# Patient Record
Sex: MALE | Race: WHITE | NOT HISPANIC OR LATINO | Employment: FULL TIME | URBAN - METROPOLITAN AREA
[De-identification: names, ages, dates, MRNs, and addresses within clinical notes are randomized per-mention and may not be internally consistent; named-entity substitution may affect disease eponyms.]

---

## 2022-12-09 ENCOUNTER — HOSPITAL ENCOUNTER (EMERGENCY)
Facility: HOSPITAL | Age: 22
Discharge: HOME | End: 2022-12-09
Attending: EMERGENCY MEDICINE
Payer: OTHER GOVERNMENT

## 2022-12-09 VITALS
HEART RATE: 87 BPM | WEIGHT: 250 LBS | OXYGEN SATURATION: 97 % | HEIGHT: 74 IN | SYSTOLIC BLOOD PRESSURE: 137 MMHG | DIASTOLIC BLOOD PRESSURE: 76 MMHG | BODY MASS INDEX: 32.08 KG/M2 | TEMPERATURE: 99 F | RESPIRATION RATE: 16 BRPM

## 2022-12-09 DIAGNOSIS — N50.811 PAIN IN RIGHT TESTICLE: Primary | ICD-10-CM

## 2022-12-09 DIAGNOSIS — R52 PAIN: ICD-10-CM

## 2022-12-09 LAB
BILIRUB UR QL STRIP: NEGATIVE
CLARITY UR: CLEAR
COLOR UR: YELLOW
GLUCOSE UR QL STRIP: NEGATIVE
HGB UR QL STRIP: NEGATIVE
KETONES UR QL STRIP: NEGATIVE
LEUKOCYTE ESTERASE UR QL STRIP: NEGATIVE
NITRITE UR QL STRIP: NEGATIVE
PH UR STRIP: 8 [PH] (ref 5–8)
PROT UR QL STRIP: NEGATIVE
SP GR UR STRIP: 1.02 (ref 1–1.03)
URN SPEC COLLECT METH UR: NORMAL
UROBILINOGEN UR STRIP-ACNC: NEGATIVE EU/DL

## 2022-12-09 PROCEDURE — 63600175 PHARM REV CODE 636 W HCPCS: Performed by: NURSE PRACTITIONER

## 2022-12-09 PROCEDURE — 99285 EMERGENCY DEPT VISIT HI MDM: CPT

## 2022-12-09 PROCEDURE — 81003 URINALYSIS AUTO W/O SCOPE: CPT | Performed by: NURSE PRACTITIONER

## 2022-12-09 PROCEDURE — 96372 THER/PROPH/DIAG INJ SC/IM: CPT | Performed by: NURSE PRACTITIONER

## 2022-12-09 PROCEDURE — 87491 CHLMYD TRACH DNA AMP PROBE: CPT | Performed by: NURSE PRACTITIONER

## 2022-12-09 PROCEDURE — 87591 N.GONORRHOEAE DNA AMP PROB: CPT | Performed by: NURSE PRACTITIONER

## 2022-12-09 RX ORDER — ACETAMINOPHEN 500 MG
1000 TABLET ORAL EVERY 6 HOURS PRN
Qty: 100 TABLET | Refills: 0 | Status: ON HOLD | OUTPATIENT
Start: 2022-12-09 | End: 2023-10-18 | Stop reason: HOSPADM

## 2022-12-09 RX ORDER — MELOXICAM 15 MG/1
15 TABLET ORAL DAILY
Qty: 30 TABLET | Refills: 0 | Status: ON HOLD | OUTPATIENT
Start: 2022-12-09 | End: 2023-10-18 | Stop reason: HOSPADM

## 2022-12-09 RX ORDER — KETOROLAC TROMETHAMINE 30 MG/ML
30 INJECTION, SOLUTION INTRAMUSCULAR; INTRAVENOUS
Status: COMPLETED | OUTPATIENT
Start: 2022-12-09 | End: 2022-12-09

## 2022-12-09 RX ADMIN — KETOROLAC TROMETHAMINE 30 MG: 30 INJECTION, SOLUTION INTRAMUSCULAR; INTRAVENOUS at 04:12

## 2022-12-09 NOTE — Clinical Note
"Surya Renee (William) was seen and treated in our emergency department on 12/9/2022.  He may return to work on 12/12/2022.       If you have any questions or concerns, please don't hesitate to call.      Dianelys Moya NP"

## 2022-12-09 NOTE — ED PROVIDER NOTES
"Encounter Date: 12/9/2022       History     Chief Complaint   Patient presents with    Testicle Pain     The patient reports non-traumatic right testicular pain x 3 days. Denies swelling, penile discharge, hematuria, dysuria, abdominal pain.      HPI  Review of patient's allergies indicates:  No Known Allergies  History reviewed. No pertinent past medical history.  History reviewed. No pertinent surgical history.  History reviewed. No pertinent family history.  Social History     Tobacco Use    Smoking status: Never     Passive exposure: Never    Smokeless tobacco: Never   Substance Use Topics    Alcohol use: Yes    Drug use: Never     Review of Systems    Physical Exam     Initial Vitals [12/09/22 1506]   BP Pulse Resp Temp SpO2   137/76 87 16 99.2 °F (37.3 °C) 97 %      MAP       --         Physical Exam    ED Course   Procedures  Labs Reviewed - No data to display       Imaging Results    None          Medications - No data to display                           Clinical Impression:    ***Please document a Clinical Impression and click the "Refresh" button to refresh your note and automatically pull in before signing.***         "

## 2022-12-09 NOTE — ED TRIAGE NOTES
Pt presents to the ED with  non-traumatic right testicular pain x 3 days.   Pt denies swelling, penile discharge, hematuria, dysuria, abdominal pain.   Pt AAOX4

## 2022-12-09 NOTE — ED PROVIDER NOTES
Encounter Date: 12/9/2022    SCRIBE #1 NOTE: I, Darshan Escobedoold, am scribing for, and in the presence of,  Dianelys Moya NP.     History     Chief Complaint   Patient presents with    Testicle Pain     The patient reports non-traumatic right testicular pain x 3 days. Denies swelling, penile discharge, hematuria, dysuria, abdominal pain.      Surya Renee is a 22 y.o. male with no pertinent past medical history who presents to the Emergency Department for evaluation of acute, constant, right testicular pain persisting for 4 days. Patient expresses concerns the pain may be related to catching a box that accidentally struck his generalized groin area. He cannot recall any other possible inciting events. Patient denies any penile discharge, dysuria, scrotal swelling, fevers, abdominal pain, vomiting, new sexual partners, or other associated symptoms. He states he has never experienced pain like this in the past. He reports that he has no known drug allergies.      The history is provided by the patient.   Review of patient's allergies indicates:  No Known Allergies  History reviewed. No pertinent past medical history.  History reviewed. No pertinent surgical history.  History reviewed. No pertinent family history.  Social History     Tobacco Use    Smoking status: Never     Passive exposure: Never    Smokeless tobacco: Never   Substance Use Topics    Alcohol use: Yes    Drug use: Never     Review of Systems   Constitutional:  Negative for fever.   HENT:  Negative for sore throat.    Eyes:  Negative for pain.   Respiratory:  Negative for cough.    Cardiovascular:  Negative for chest pain.   Gastrointestinal:  Negative for abdominal pain, nausea and vomiting.   Genitourinary:  Positive for testicular pain. Negative for dysuria, frequency, hematuria, penile discharge and scrotal swelling.   Musculoskeletal:  Negative for back pain.   Skin:  Negative for rash.   Neurological:  Negative for headaches.     Physical  Exam     Initial Vitals [12/09/22 1506]   BP Pulse Resp Temp SpO2   137/76 87 16 99.2 °F (37.3 °C) 97 %      MAP       --         Physical Exam    Nursing note and vitals reviewed.  Constitutional: He appears well-developed and well-nourished.   HENT:   Head: Normocephalic.   Eyes: Conjunctivae are normal.   Neck: Neck supple.   Normal range of motion.  Abdominal: Abdomen is soft. He exhibits no distension. There is no abdominal tenderness. There is no rebound and no guarding.   Genitourinary:    Penis normal.   No discharge found.    Genitourinary Comments: No swelling, tenderness or erythema.  No hernia present.  No lesions     Musculoskeletal:         General: Normal range of motion.      Cervical back: Normal range of motion and neck supple.     Neurological: He is alert and oriented to person, place, and time. GCS score is 15. GCS eye subscore is 4. GCS verbal subscore is 5. GCS motor subscore is 6.   Skin: Skin is warm and dry. Capillary refill takes less than 2 seconds.   Psychiatric: He has a normal mood and affect.       ED Course   Procedures  Labs Reviewed   C. TRACHOMATIS/N. GONORRHOEAE BY AMP DNA   URINALYSIS, REFLEX TO URINE CULTURE    Narrative:     Specimen Source->Urine          Imaging Results              US Scrotum And Testicles (Final result)  Result time 12/09/22 16:20:27      Final result by Govind Cervantes Jr., MD (12/09/22 16:20:27)                   Impression:      No significant abnormality.      Electronically signed by: Govind Bland Jr  Date:    12/09/2022  Time:    16:20               Narrative:    EXAMINATION:  US SCROTUM AND TESTICLES    CLINICAL HISTORY:  Pain, unspecified    TECHNIQUE:  Sonography of the scrotum and testes.    COMPARISON:  None.    FINDINGS:  Right Testicle:    *Size: 6.2 x 2.5 x 2.8 cm  *Appearance: Normal.  *Flow: Normal arterial and venous flow  *Epididymis: Normal.  *Hydrocele: None.  *Varicocele: None.  .    Left Testicle:    *Size: 5.7 x 2.5 x 3.2  cm  *Appearance: Normal.  *Flow: Normal arterial and venous flow  *Epididymis: Normal.  *Hydrocele: None.  *Varicocele: None.  .    Other findings: None.                                       Medications   ketorolac injection 30 mg (30 mg Intramuscular Given 12/9/22 1612)     Medical Decision Making:   History:   Old Medical Records: I decided to obtain old medical records.  Differential Diagnosis:   Hernia, orchitis, epididymitis  Clinical Tests:   Lab Tests: Ordered and Reviewed  Radiological Study: Reviewed and Ordered  ED Management:  Diagnosis management comments: This is an urgent evaluation of a 22-year-old male that presented to the ER with c/o right-sided testicular pain x4 days. Pts exam was as above.     Labs and u/s were reviewed and discussed with pt.  I will discharge patient with Mobic and Tylenol for symptomatic treatment of his discomfort.  GC chlamydia are pending although patient states that that is not likely.  Patient has been instructed to use scrotal support and cool compresses to area.  He has been given follow-up instructions with urology if there is no improvement in his condition.    Based on exam today - I have low suspicion for medical, surgical or other life threatening condition and I believe pt is safe for discharge and outpatient f/u.    Pt verbalizes understanding of d/c instructions and will return for worsening condition.              Scribe Attestation:   Scribe #1: I performed the above scribed service and the documentation accurately describes the services I performed. I attest to the accuracy of the note.                   Clinical Impression:   Final diagnoses:  [R52] Pain  [N50.811] Pain in right testicle (Primary)        ED Disposition Condition    Discharge Stable          ED Prescriptions       Medication Sig Dispense Start Date End Date Auth. Provider    meloxicam (MOBIC) 15 MG tablet Take 1 tablet (15 mg total) by mouth once daily. 30 tablet 12/9/2022 -- Dianelys OSEGUERA  DERIAN Moya    acetaminophen (TYLENOL) 500 MG tablet Take 2 tablets (1,000 mg total) by mouth every 6 (six) hours as needed for Pain (or fever). 100 tablet 12/9/2022 -- Dianelys Moya NP          Follow-up Information       Follow up With Specialties Details Why Contact Info    Emma Woo NP Family Medicine   [address] [facility] [address]  [phone]      [facility] - Emergency Dept Emergency Medicine  If symptoms worsen or any other concerns [address] [place], [place] [address] [id number] [phone]    Marcio Rodrigues MD Urology   [address] [id number] [phone]            I, Dianelys Moya NP-C  , personally performed the services described in this documentation. All medical record entries made by the scribe were at my direction and in my presence. I have reviewed the chart and agree that the record reflects my personal performance and is accurate and complete.       Dianelys Moya NP  12/09/22 [id number]

## 2022-12-11 LAB
C TRACH DNA SPEC QL NAA+PROBE: NOT DETECTED
N GONORRHOEA DNA SPEC QL NAA+PROBE: NOT DETECTED

## 2023-10-12 ENCOUNTER — HOSPITAL ENCOUNTER (EMERGENCY)
Facility: OTHER | Age: 23
Discharge: PSYCHIATRIC OR SUBSTANCE USE FACILITY | End: 2023-10-12
Attending: EMERGENCY MEDICINE
Payer: OTHER GOVERNMENT

## 2023-10-12 VITALS
HEIGHT: 74 IN | WEIGHT: 250 LBS | OXYGEN SATURATION: 97 % | TEMPERATURE: 98 F | DIASTOLIC BLOOD PRESSURE: 79 MMHG | RESPIRATION RATE: 16 BRPM | BODY MASS INDEX: 32.08 KG/M2 | SYSTOLIC BLOOD PRESSURE: 137 MMHG | HEART RATE: 81 BPM

## 2023-10-12 DIAGNOSIS — F32.A DEPRESSION WITH SUICIDAL IDEATION: Primary | ICD-10-CM

## 2023-10-12 DIAGNOSIS — R45.851 DEPRESSION WITH SUICIDAL IDEATION: Primary | ICD-10-CM

## 2023-10-12 LAB
ALBUMIN SERPL BCP-MCNC: 4.3 G/DL (ref 3.5–5.2)
ALP SERPL-CCNC: 103 U/L (ref 55–135)
ALT SERPL W/O P-5'-P-CCNC: 44 U/L (ref 10–44)
AMPHET+METHAMPHET UR QL: NEGATIVE
ANION GAP SERPL CALC-SCNC: 14 MMOL/L (ref 8–16)
APAP SERPL-MCNC: <3 UG/ML (ref 10–20)
AST SERPL-CCNC: 25 U/L (ref 10–40)
BARBITURATES UR QL SCN>200 NG/ML: NEGATIVE
BASOPHILS # BLD AUTO: 0.04 K/UL (ref 0–0.2)
BASOPHILS NFR BLD: 0.5 % (ref 0–1.9)
BENZODIAZ UR QL SCN>200 NG/ML: NEGATIVE
BILIRUB SERPL-MCNC: 0.2 MG/DL (ref 0.1–1)
BILIRUB UR QL STRIP: NEGATIVE
BUN SERPL-MCNC: 14 MG/DL (ref 6–20)
BZE UR QL SCN: NEGATIVE
CALCIUM SERPL-MCNC: 9.9 MG/DL (ref 8.7–10.5)
CANNABINOIDS UR QL SCN: NEGATIVE
CHLORIDE SERPL-SCNC: 106 MMOL/L (ref 95–110)
CLARITY UR: CLEAR
CO2 SERPL-SCNC: 23 MMOL/L (ref 23–29)
COLOR UR: YELLOW
CREAT SERPL-MCNC: 0.9 MG/DL (ref 0.5–1.4)
CREAT UR-MCNC: 231.6 MG/DL (ref 23–375)
DIFFERENTIAL METHOD: NORMAL
EOSINOPHIL # BLD AUTO: 0 K/UL (ref 0–0.5)
EOSINOPHIL NFR BLD: 0.2 % (ref 0–8)
ERYTHROCYTE [DISTWIDTH] IN BLOOD BY AUTOMATED COUNT: 12.6 % (ref 11.5–14.5)
EST. GFR  (NO RACE VARIABLE): >60 ML/MIN/1.73 M^2
ETHANOL SERPL-MCNC: <10 MG/DL
GLUCOSE SERPL-MCNC: 95 MG/DL (ref 70–110)
GLUCOSE UR QL STRIP: NEGATIVE
HCT VFR BLD AUTO: 45 % (ref 40–54)
HGB BLD-MCNC: 14.7 G/DL (ref 14–18)
HGB UR QL STRIP: NEGATIVE
IMM GRANULOCYTES # BLD AUTO: 0.02 K/UL (ref 0–0.04)
IMM GRANULOCYTES NFR BLD AUTO: 0.2 % (ref 0–0.5)
KETONES UR QL STRIP: NEGATIVE
LEUKOCYTE ESTERASE UR QL STRIP: NEGATIVE
LYMPHOCYTES # BLD AUTO: 2 K/UL (ref 1–4.8)
LYMPHOCYTES NFR BLD: 23.3 % (ref 18–48)
MCH RBC QN AUTO: 28.7 PG (ref 27–31)
MCHC RBC AUTO-ENTMCNC: 32.7 G/DL (ref 32–36)
MCV RBC AUTO: 88 FL (ref 82–98)
METHADONE UR QL SCN>300 NG/ML: NEGATIVE
MONOCYTES # BLD AUTO: 0.6 K/UL (ref 0.3–1)
MONOCYTES NFR BLD: 7.5 % (ref 4–15)
NEUTROPHILS # BLD AUTO: 5.9 K/UL (ref 1.8–7.7)
NEUTROPHILS NFR BLD: 68.3 % (ref 38–73)
NITRITE UR QL STRIP: NEGATIVE
NRBC BLD-RTO: 0 /100 WBC
OPIATES UR QL SCN: NEGATIVE
PCP UR QL SCN>25 NG/ML: NEGATIVE
PH UR STRIP: 6 [PH] (ref 5–8)
PLATELET # BLD AUTO: 345 K/UL (ref 150–450)
PMV BLD AUTO: 9.7 FL (ref 9.2–12.9)
POTASSIUM SERPL-SCNC: 3.7 MMOL/L (ref 3.5–5.1)
PROT SERPL-MCNC: 8.3 G/DL (ref 6–8.4)
PROT UR QL STRIP: NEGATIVE
RBC # BLD AUTO: 5.13 M/UL (ref 4.6–6.2)
SODIUM SERPL-SCNC: 143 MMOL/L (ref 136–145)
SP GR UR STRIP: 1.02 (ref 1–1.03)
TOXICOLOGY INFORMATION: NORMAL
TSH SERPL DL<=0.005 MIU/L-ACNC: 1.86 UIU/ML (ref 0.4–4)
URN SPEC COLLECT METH UR: NORMAL
UROBILINOGEN UR STRIP-ACNC: NEGATIVE EU/DL
WBC # BLD AUTO: 8.57 K/UL (ref 3.9–12.7)

## 2023-10-12 PROCEDURE — 80143 DRUG ASSAY ACETAMINOPHEN: CPT | Performed by: EMERGENCY MEDICINE

## 2023-10-12 PROCEDURE — 85025 COMPLETE CBC W/AUTO DIFF WBC: CPT | Performed by: EMERGENCY MEDICINE

## 2023-10-12 PROCEDURE — 84443 ASSAY THYROID STIM HORMONE: CPT | Performed by: EMERGENCY MEDICINE

## 2023-10-12 PROCEDURE — 80307 DRUG TEST PRSMV CHEM ANLYZR: CPT | Performed by: EMERGENCY MEDICINE

## 2023-10-12 PROCEDURE — 99205 PR OFFICE/OUTPT VISIT, NEW, LEVL V, 60-74 MIN: ICD-10-PCS | Mod: 95,,, | Performed by: PSYCHIATRY & NEUROLOGY

## 2023-10-12 PROCEDURE — 81003 URINALYSIS AUTO W/O SCOPE: CPT | Mod: 59 | Performed by: EMERGENCY MEDICINE

## 2023-10-12 PROCEDURE — 80053 COMPREHEN METABOLIC PANEL: CPT | Performed by: EMERGENCY MEDICINE

## 2023-10-12 PROCEDURE — 99285 EMERGENCY DEPT VISIT HI MDM: CPT

## 2023-10-12 PROCEDURE — 82077 ASSAY SPEC XCP UR&BREATH IA: CPT | Performed by: EMERGENCY MEDICINE

## 2023-10-12 PROCEDURE — 99205 OFFICE O/P NEW HI 60 MIN: CPT | Mod: 95,,, | Performed by: PSYCHIATRY & NEUROLOGY

## 2023-10-12 NOTE — ED PROVIDER NOTES
"Encounter Date: 10/12/2023    SCRIBE #1 NOTE: I, Manny Chase, am scribing for, and in the presence of,  Elizabeth Duvall MD. I have scribed the following portions of the note - Other sections scribed: HPI, ROS, PE.       History     Chief Complaint   Patient presents with    Suicidal     Pt c/o suicidal ideation and bingeing and purging behavior in the last few days.      23 year-old male who presents with complaint of suicidal ideation for the past week. He states that he has been struggling with depression for the last few months and was prescribed Zoloft 50 mg by his psychiatrist one month ago.  Since starting medication, he noticed improvement in his depression symptoms until last week, at which time "everything bubbled back over."  While he does not report any new significant life stressors, he notes that his current workload in the  has him under increased stress.  He reports frequent suicidal thoughts include wanting to cut himself with a knife, urges to take a large amount of medication in an attempt "to go to sleep and never wake up", and thoughts of driving off a bridge. He also reports difficulty sleeping at night but taking naps during the day, and he goes through periods of switching between not eating, binge eating, and then taking laxatives to purge after eating. He denies any recent illness or any fever, cough, nausea, or vomiting. His PMHx does include asthma. However, he denies any alcohol, tobacco, or illicit drug use. NKDA. This is the extent of the patient's complaints at this time.    The history is provided by the patient.     Review of patient's allergies indicates:  No Known Allergies  Past Medical History:   Diagnosis Date    Asthma     Depression      History reviewed. No pertinent surgical history.  History reviewed. No pertinent family history.  Social History     Tobacco Use    Smoking status: Never     Passive exposure: Never    Smokeless tobacco: Never   Substance Use " Topics    Alcohol use: Yes    Drug use: Never     Review of Systems   Constitutional:  Negative for chills and fever.   HENT:  Negative for congestion and sore throat.    Eyes:  Negative for visual disturbance.   Respiratory:  Negative for cough and shortness of breath.    Cardiovascular:  Negative for chest pain and palpitations.   Gastrointestinal:  Negative for abdominal pain, diarrhea and vomiting.   Genitourinary:  Negative for decreased urine volume, dysuria and frequency.   Musculoskeletal:  Negative for joint swelling, neck pain and neck stiffness.   Skin:  Negative for rash and wound.   Neurological:  Negative for weakness, numbness and headaches.   Psychiatric/Behavioral:  Positive for dysphoric mood and suicidal ideas. Negative for behavioral problems, confusion and hallucinations.        Physical Exam     Initial Vitals [10/12/23 1446]   BP Pulse Resp Temp SpO2   (!) 142/85 88 19 98.1 °F (36.7 °C) 96 %      MAP       --         Physical Exam    Constitutional: He appears well-developed and well-nourished.   HENT:   Head: Normocephalic and atraumatic.   Nose: Nose normal.   Mouth/Throat: Oropharynx is clear and moist.   Eyes: Conjunctivae and EOM are normal. Pupils are equal, round, and reactive to light.   Neck: Neck supple.   Normal range of motion.  Cardiovascular:  Normal rate and regular rhythm.     Exam reveals no gallop and no friction rub.       No murmur heard.  Pulmonary/Chest: Breath sounds normal. No respiratory distress. He has no wheezes. He has no rales.   Abdominal: Abdomen is soft. There is no abdominal tenderness. There is no rebound and no guarding.   Musculoskeletal:         General: No tenderness or edema.      Cervical back: Normal range of motion and neck supple.     Neurological: He is alert and oriented to person, place, and time. He has normal strength. No cranial nerve deficit or sensory deficit. Gait normal. GCS score is 15. GCS eye subscore is 4. GCS verbal subscore is 5. GCS  motor subscore is 6.   Skin: Skin is warm and dry. No rash noted.   Psychiatric: His speech is normal. He is not actively hallucinating. He expresses suicidal ideation. He expresses no homicidal ideation.   Depressed mood. Tearful affect.          ED Course   Procedures  Labs Reviewed   ACETAMINOPHEN LEVEL - Abnormal; Notable for the following components:       Result Value    Acetaminophen (Tylenol), Serum <3.0 (*)     All other components within normal limits   CBC W/ AUTO DIFFERENTIAL   COMPREHENSIVE METABOLIC PANEL   TSH   URINALYSIS, REFLEX TO URINE CULTURE    Narrative:     Specimen Source->Urine   DRUG SCREEN PANEL, URINE EMERGENCY    Narrative:     Specimen Source->Urine   ALCOHOL,MEDICAL (ETHANOL)          Imaging Results    None          Medications - No data to display  Medical Decision Making  Emergent evaluation a 23-year-old male who presents with complaint of suicidal ideations, recent history of depression on Zoloft.  Vital signs are benign, afebrile.  Physical exam is benign other than dysphoric mood and suicidal ideations.  Patient had telemedicine psychiatry consultation, recommended psychiatric placement.  Pec was placed as I do feel he is potentially a danger to himself.  Patient is medically cleared and transferred to an appropriate psychiatric facility once accepted.    Amount and/or Complexity of Data Reviewed  Labs: ordered. Decision-making details documented in ED Course.            Scribe Attestation:   Scribe #1: I performed the above scribed service and the documentation accurately describes the services I performed. I attest to the accuracy of the note.    Physician Attestation for Scribe: I, Elizabeth Duvall, reviewed documentation as scribed in my presence, which is both accurate and complete.        ED Course as of 10/12/23 1801   Thu Oct 12, 2023   1638 TSH  Reviewed and within normal limits. [AK]   1638 CBC auto differential  Reviewed and within normal limits, no leukocytosis or  profound anemia. [AK]   1638 Comprehensive metabolic panel  Reviewed and within normal limits.  No evidence of hepatitis, azotemia, or major electrolyte derangement. [AK]   1713 Acetaminophen level(!)  Reviewed and negative. [AK]   1713 Ethanol  Reviewed and negative. [AK]   1713 Urinalysis, Reflex to Urine Culture Urine, Clean Catch  Reviewed and negative. [AK]   1758 Drug screen panel, emergency  Reviewed and negative. [AK]      ED Course User Index  [AK] Elizabeth Duvall MD       Medically cleared for psychiatry placement: 10/12/2023  5:14 PM            Clinical Impression:   Final diagnoses:  [F32.A, R45.851] Depression with suicidal ideation (Primary)        ED Disposition Condition    Transfer to Psych Facility Stable                 Elizabeth Duvall MD  10/12/23 1813
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
## 2023-10-12 NOTE — ED NOTES
Pt's belongings and valuables collected and given to security.    Belongings:  Pair of shoes  Pair of pants  Pair of socks  1 shirt  1 sweatshirt    Valuables: [phone]  1 cell phone  1 watch  1 wallet  1 [place] drivers license  2 [employer] cards  1 [employer] card  1  ID  1 key ring with 4 keys

## 2023-10-13 NOTE — ED NOTES
Called [facility], the accepting nurse was still giving medications, will call again after 30 minutes.

## 2023-10-14 PROBLEM — R45.851 SUICIDAL IDEATION: Status: ACTIVE | Noted: 2023-10-14

## 2023-10-16 PROBLEM — F32.2 CURRENT SEVERE EPISODE OF MAJOR DEPRESSIVE DISORDER WITHOUT PSYCHOTIC FEATURES: Status: ACTIVE | Noted: 2023-10-16

## 2023-10-18 PROBLEM — F41.1 GENERALIZED ANXIETY DISORDER: Status: ACTIVE | Noted: 2023-10-18
